# Patient Record
Sex: MALE | Race: WHITE | NOT HISPANIC OR LATINO | URBAN - METROPOLITAN AREA
[De-identification: names, ages, dates, MRNs, and addresses within clinical notes are randomized per-mention and may not be internally consistent; named-entity substitution may affect disease eponyms.]

---

## 2020-07-25 ENCOUNTER — TELEPHONE ENCOUNTER (OUTPATIENT)
Dept: URBAN - METROPOLITAN AREA CLINIC 13 | Facility: CLINIC | Age: 25
End: 2020-07-25

## 2020-07-26 ENCOUNTER — TELEPHONE ENCOUNTER (OUTPATIENT)
Dept: URBAN - METROPOLITAN AREA CLINIC 13 | Facility: CLINIC | Age: 25
End: 2020-07-26

## 2022-09-22 ENCOUNTER — CLAIMS CREATED FROM THE CLAIM WINDOW (OUTPATIENT)
Dept: URBAN - METROPOLITAN AREA CLINIC 113 | Facility: CLINIC | Age: 27
End: 2022-09-22
Payer: COMMERCIAL

## 2022-09-22 ENCOUNTER — WEB ENCOUNTER (OUTPATIENT)
Dept: URBAN - METROPOLITAN AREA CLINIC 113 | Facility: CLINIC | Age: 27
End: 2022-09-22

## 2022-09-22 ENCOUNTER — DASHBOARD ENCOUNTERS (OUTPATIENT)
Age: 27
End: 2022-09-22

## 2022-09-22 VITALS
BODY MASS INDEX: 31.15 KG/M2 | TEMPERATURE: 97.5 F | HEIGHT: 61 IN | DIASTOLIC BLOOD PRESSURE: 65 MMHG | RESPIRATION RATE: 14 BRPM | SYSTOLIC BLOOD PRESSURE: 109 MMHG | WEIGHT: 165 LBS | HEART RATE: 63 BPM

## 2022-09-22 DIAGNOSIS — K58.1 IRRITABLE BOWEL SYNDROME WITH CONSTIPATION: ICD-10-CM

## 2022-09-22 PROBLEM — 440630006: Status: ACTIVE | Noted: 2022-09-22

## 2022-09-22 PROCEDURE — 99203 OFFICE O/P NEW LOW 30 MIN: CPT | Performed by: STUDENT IN AN ORGANIZED HEALTH CARE EDUCATION/TRAINING PROGRAM

## 2022-09-22 RX ORDER — DICYCLOMINE HYDROCHLORIDE 20 MG/1
1 TABLET TABLET ORAL THREE TIMES A DAY
Qty: 30 TABLET | Refills: 0 | OUTPATIENT
Start: 2022-09-22 | End: 2022-10-02

## 2022-09-22 NOTE — HPI-TODAY'S VISIT:
Mr. Forrest is a pleasant 27 year old male with no significant medical history who presents to the GI clinica as a hospital ER follow up. His PCP is Dr. Harley. . Hospital record reviewed. Patient presented with acute onset RUQ pain for one day associated with fever per patient. He has had pain like this in the past but not to this degree. He had gone to urgent care initially who referred him to the ER for evaluation of choleystitis. In the ER he was afebrile and normotensive. CBC unremarkable. TB 1.7, rest of liver enzymes wnl. Lipase 29. Ultrasound performed, liver and GB unremarkable. No cholelithiasis, normal CBD diameter, no intrahepatic ductal dilation with negative sonographic Demarco sign.  . He states that he has had intermittent abdominal pain since he was a child related to ingestion of certain foods and associated with altered bowel movements, constipation > diarrhea. During times of abdominal pain, when he is able to move his bowels he feels better. He has never had an endoscopy or colonoscopy. He seldom uses NSAIDs. He denies fevers/chills, nausea/vomiting, reflux, dysphagia/odynophagia, weight loss, rectal bleeding.

## 2022-12-02 ENCOUNTER — OFFICE VISIT (OUTPATIENT)
Dept: URBAN - METROPOLITAN AREA CLINIC 113 | Facility: CLINIC | Age: 27
End: 2022-12-02